# Patient Record
Sex: FEMALE
[De-identification: names, ages, dates, MRNs, and addresses within clinical notes are randomized per-mention and may not be internally consistent; named-entity substitution may affect disease eponyms.]

---

## 2019-05-29 ENCOUNTER — APPOINTMENT (OUTPATIENT)
Dept: GYNECOLOGIC ONCOLOGY | Facility: CLINIC | Age: 44
End: 2019-05-29
Payer: COMMERCIAL

## 2019-05-29 VITALS
SYSTOLIC BLOOD PRESSURE: 124 MMHG | OXYGEN SATURATION: 98 % | HEIGHT: 63 IN | HEART RATE: 82 BPM | DIASTOLIC BLOOD PRESSURE: 88 MMHG | RESPIRATION RATE: 16 BRPM | WEIGHT: 140 LBS | BODY MASS INDEX: 24.8 KG/M2

## 2019-05-29 DIAGNOSIS — Z80.7 FAMILY HISTORY OF OTHER MALIGNANT NEOPLASMS OF LYMPHOID, HEMATOPOIETIC AND RELATED TISSUES: ICD-10-CM

## 2019-05-29 DIAGNOSIS — Z80.0 FAMILY HISTORY OF MALIGNANT NEOPLASM OF DIGESTIVE ORGANS: ICD-10-CM

## 2019-05-29 DIAGNOSIS — K57.90 DIVERTICULOSIS OF INTESTINE, PART UNSPECIFIED, W/OUT PERFORATION OR ABSCESS W/OUT BLEEDING: ICD-10-CM

## 2019-05-29 DIAGNOSIS — F17.200 NICOTINE DEPENDENCE, UNSPECIFIED, UNCOMPLICATED: ICD-10-CM

## 2019-05-29 DIAGNOSIS — N83.209 UNSPECIFIED OVARIAN CYST, UNSPECIFIED SIDE: ICD-10-CM

## 2019-05-29 DIAGNOSIS — Z80.41 FAMILY HISTORY OF MALIGNANT NEOPLASM OF OVARY: ICD-10-CM

## 2019-05-29 DIAGNOSIS — Z78.9 OTHER SPECIFIED HEALTH STATUS: ICD-10-CM

## 2019-05-29 DIAGNOSIS — Z87.898 PERSONAL HISTORY OF OTHER SPECIFIED CONDITIONS: ICD-10-CM

## 2019-05-29 PROCEDURE — 99245 OFF/OP CONSLTJ NEW/EST HI 55: CPT | Mod: 25

## 2019-05-29 PROCEDURE — 93976 VASCULAR STUDY: CPT | Mod: 59

## 2019-05-29 PROCEDURE — 76857 US EXAM PELVIC LIMITED: CPT | Mod: 59

## 2019-05-29 PROCEDURE — 76830 TRANSVAGINAL US NON-OB: CPT | Mod: 59

## 2019-05-29 NOTE — PROCEDURE
[Cervical Pap] : cervical pap smear  [Thickened Endometrium] : thickened endometrium [Patient] : the patient

## 2019-05-29 NOTE — HISTORY OF PRESENT ILLNESS
[FreeTextEntry1] : This 42yo ,  x2, c/s x1 LMP at 41 presented to an urgent care on 19 for fatigue, diarrhea and vomiting. She was sent for a ctscan abd/pelvis which revealed a right ovarian cyst and a well defined somewhat thick-walled low density structure related to right adnexa. Also seen was diverticular disease and non obstructing fat containing supraumbilical hernia as well as a left renal cyst.  on 19 is 10. OVA 1 was 3.9 (low risk). Pt still has diarrhea once daily, denies blood in stools. She admits to bloating and abdominal discomfort. She does not have a PCP or Ob/gyn. History of abnormal paps and HPV and is overdue for a pap and mammogram. She thinks her mother may have had ovarian cancer and cervical cancer. \par \par Pap smear-over 5 years ago, h/o abnormal paps and HPV, h/o cryotherapy and laser\par Mammogram-5 years ago\par Colonoscopy-never

## 2019-05-29 NOTE — CHIEF COMPLAINT
[FreeTextEntry1] : [facility]\par \par [facility] [phone] at [address] [place] [id number]\par

## 2019-05-29 NOTE — ASSESSMENT
[FreeTextEntry1] : Discussed with patient that on sonogram today, her lining is thickened. Since she is postmenopausal, endometrial sampling is necessary. There is a 2cm benign appearing cyst on the left ovary which is not worrisome. Discussed the limitations of tumor markers. Pt thinks her mother may have had cervical and ovarian cancer but is not 100% sure. I encouraged the patient to get a more thorough cancer history from her mother and explained that it would be beneficial if her mother had genetic testing. Pt will look into this. If her mother definitely had ovarian cancer, then it would be prudent for patient to have it. Stressed to patient the importance of being compliant with her screening exams. Pap smear was performed today.   \par \par I discussed at length with the patient the nature, purpose, risks, benefits, and alternatives to dilation and curettage possible hysteroscopy possible myosure. She understands the risks to include (but not be limited to): uterine perforation with possible need for laparoscopy and/or laparotomy; infection with need for hospitalization; fluid overload with possible critical illness as a consequence; and bleeding with need for transfusion.  The patient agrees to proceed.\par

## 2019-05-29 NOTE — PHYSICAL EXAM
[Normal] : Parametria: Normal [de-identified] : Patient was interviewed and examined with chaperone present. Name of chaperone: Ashley Lane

## 2019-05-29 NOTE — END OF VISIT
[FreeTextEntry3] : Written by Ashley LINDSEY, acting as a scribe for Dr. Angel Boyce.\par This note accurately reflects the work and decisions made by me.\par

## 2019-05-30 LAB — HPV HIGH+LOW RISK DNA PNL CVX: NOT DETECTED

## 2019-06-03 LAB — CYTOLOGY CVX/VAG DOC THIN PREP: NORMAL

## 2019-06-12 DIAGNOSIS — Z01.818 ENCOUNTER FOR OTHER PREPROCEDURAL EXAMINATION: ICD-10-CM

## 2019-06-25 ENCOUNTER — TRANSCRIPTION ENCOUNTER (OUTPATIENT)
Age: 44
End: 2019-06-25

## 2019-06-25 ENCOUNTER — INPATIENT (INPATIENT)
Facility: HOSPITAL | Age: 44
LOS: 0 days | Discharge: HOME | DRG: 745 | End: 2019-06-25
Attending: OBSTETRICS & GYNECOLOGY | Admitting: OBSTETRICS & GYNECOLOGY
Payer: COMMERCIAL

## 2019-06-25 ENCOUNTER — RESULT REVIEW (OUTPATIENT)
Age: 44
End: 2019-06-25

## 2019-06-25 VITALS — RESPIRATION RATE: 17 BRPM | HEART RATE: 81 BPM | OXYGEN SATURATION: 100 % | TEMPERATURE: 98 F

## 2019-06-25 VITALS
DIASTOLIC BLOOD PRESSURE: 78 MMHG | SYSTOLIC BLOOD PRESSURE: 118 MMHG | HEIGHT: 60 IN | TEMPERATURE: 99 F | HEART RATE: 98 BPM | WEIGHT: 139.99 LBS | RESPIRATION RATE: 20 BRPM

## 2019-06-25 DIAGNOSIS — R10.2 PELVIC AND PERINEAL PAIN: ICD-10-CM

## 2019-06-25 LAB
ALBUMIN SERPL ELPH-MCNC: 4.8 G/DL — SIGNIFICANT CHANGE UP (ref 3.3–5.2)
ALP SERPL-CCNC: 76 U/L — SIGNIFICANT CHANGE UP (ref 40–120)
ALT FLD-CCNC: 10 U/L — SIGNIFICANT CHANGE UP
ANION GAP SERPL CALC-SCNC: 10 MMOL/L — SIGNIFICANT CHANGE UP (ref 5–17)
APPEARANCE UR: CLEAR — SIGNIFICANT CHANGE UP
AST SERPL-CCNC: 16 U/L — SIGNIFICANT CHANGE UP
BASOPHILS # BLD AUTO: 0 K/UL — SIGNIFICANT CHANGE UP (ref 0–0.2)
BASOPHILS NFR BLD AUTO: 0.3 % — SIGNIFICANT CHANGE UP (ref 0–2)
BILIRUB SERPL-MCNC: 0.5 MG/DL — SIGNIFICANT CHANGE UP (ref 0.4–2)
BILIRUB UR-MCNC: NEGATIVE — SIGNIFICANT CHANGE UP
BUN SERPL-MCNC: 19 MG/DL — SIGNIFICANT CHANGE UP (ref 8–20)
CALCIUM SERPL-MCNC: 9.8 MG/DL — SIGNIFICANT CHANGE UP (ref 8.6–10.2)
CHLORIDE SERPL-SCNC: 101 MMOL/L — SIGNIFICANT CHANGE UP (ref 98–107)
CO2 SERPL-SCNC: 27 MMOL/L — SIGNIFICANT CHANGE UP (ref 22–29)
COLOR SPEC: YELLOW — SIGNIFICANT CHANGE UP
CREAT SERPL-MCNC: 0.62 MG/DL — SIGNIFICANT CHANGE UP (ref 0.5–1.3)
DIFF PNL FLD: ABNORMAL
EOSINOPHIL # BLD AUTO: 0.4 K/UL — SIGNIFICANT CHANGE UP (ref 0–0.5)
EOSINOPHIL NFR BLD AUTO: 3.6 % — SIGNIFICANT CHANGE UP (ref 0–6)
EPI CELLS # UR: ABNORMAL
GLUCOSE SERPL-MCNC: 101 MG/DL — SIGNIFICANT CHANGE UP (ref 70–115)
GLUCOSE UR QL: NEGATIVE MG/DL — SIGNIFICANT CHANGE UP
HCG SERPL-ACNC: <4 MIU/ML — SIGNIFICANT CHANGE UP
HCT VFR BLD CALC: 40.9 % — SIGNIFICANT CHANGE UP (ref 37–47)
HGB BLD-MCNC: 13.8 G/DL — SIGNIFICANT CHANGE UP (ref 12–16)
KETONES UR-MCNC: NEGATIVE — SIGNIFICANT CHANGE UP
LEUKOCYTE ESTERASE UR-ACNC: NEGATIVE — SIGNIFICANT CHANGE UP
LYMPHOCYTES # BLD AUTO: 2.8 K/UL — SIGNIFICANT CHANGE UP (ref 1–4.8)
LYMPHOCYTES # BLD AUTO: 27.4 % — SIGNIFICANT CHANGE UP (ref 20–55)
MCHC RBC-ENTMCNC: 31.8 PG — HIGH (ref 27–31)
MCHC RBC-ENTMCNC: 33.7 G/DL — SIGNIFICANT CHANGE UP (ref 32–36)
MCV RBC AUTO: 94.2 FL — SIGNIFICANT CHANGE UP (ref 81–99)
MONOCYTES # BLD AUTO: 0.8 K/UL — SIGNIFICANT CHANGE UP (ref 0–0.8)
MONOCYTES NFR BLD AUTO: 7.3 % — SIGNIFICANT CHANGE UP (ref 3–10)
NEUTROPHILS # BLD AUTO: 6.3 K/UL — SIGNIFICANT CHANGE UP (ref 1.8–8)
NEUTROPHILS NFR BLD AUTO: 61.2 % — SIGNIFICANT CHANGE UP (ref 37–73)
NITRITE UR-MCNC: NEGATIVE — SIGNIFICANT CHANGE UP
PH UR: 6.5 — SIGNIFICANT CHANGE UP (ref 5–8)
PLATELET # BLD AUTO: 294 K/UL — SIGNIFICANT CHANGE UP (ref 150–400)
POTASSIUM SERPL-MCNC: 4.4 MMOL/L — SIGNIFICANT CHANGE UP (ref 3.5–5.3)
POTASSIUM SERPL-SCNC: 4.4 MMOL/L — SIGNIFICANT CHANGE UP (ref 3.5–5.3)
PROT SERPL-MCNC: 7.5 G/DL — SIGNIFICANT CHANGE UP (ref 6.6–8.7)
PROT UR-MCNC: NEGATIVE MG/DL — SIGNIFICANT CHANGE UP
RBC # BLD: 4.34 M/UL — LOW (ref 4.4–5.2)
RBC # FLD: 13.1 % — SIGNIFICANT CHANGE UP (ref 11–15.6)
RBC CASTS # UR COMP ASSIST: SIGNIFICANT CHANGE UP /HPF (ref 0–4)
SODIUM SERPL-SCNC: 138 MMOL/L — SIGNIFICANT CHANGE UP (ref 135–145)
SP GR SPEC: 1.01 — SIGNIFICANT CHANGE UP (ref 1.01–1.02)
UROBILINOGEN FLD QL: NEGATIVE MG/DL — SIGNIFICANT CHANGE UP
WBC # BLD: 10.3 K/UL — SIGNIFICANT CHANGE UP (ref 4.8–10.8)
WBC # FLD AUTO: 10.3 K/UL — SIGNIFICANT CHANGE UP (ref 4.8–10.8)
WBC UR QL: SIGNIFICANT CHANGE UP

## 2019-06-25 PROCEDURE — 80053 COMPREHEN METABOLIC PANEL: CPT

## 2019-06-25 PROCEDURE — 88305 TISSUE EXAM BY PATHOLOGIST: CPT | Mod: 26

## 2019-06-25 PROCEDURE — 99285 EMERGENCY DEPT VISIT HI MDM: CPT | Mod: 25

## 2019-06-25 PROCEDURE — 88305 TISSUE EXAM BY PATHOLOGIST: CPT

## 2019-06-25 PROCEDURE — 99053 MED SERV 10PM-8AM 24 HR FAC: CPT

## 2019-06-25 PROCEDURE — 87086 URINE CULTURE/COLONY COUNT: CPT

## 2019-06-25 PROCEDURE — 36415 COLL VENOUS BLD VENIPUNCTURE: CPT

## 2019-06-25 PROCEDURE — 84702 CHORIONIC GONADOTROPIN TEST: CPT

## 2019-06-25 PROCEDURE — 81001 URINALYSIS AUTO W/SCOPE: CPT

## 2019-06-25 PROCEDURE — 85027 COMPLETE CBC AUTOMATED: CPT

## 2019-06-25 PROCEDURE — 96374 THER/PROPH/DIAG INJ IV PUSH: CPT

## 2019-06-25 RX ORDER — ACETAMINOPHEN 500 MG
1000 TABLET ORAL ONCE
Refills: 0 | Status: COMPLETED | OUTPATIENT
Start: 2019-06-25 | End: 2019-06-25

## 2019-06-25 RX ORDER — KETOROLAC TROMETHAMINE 30 MG/ML
15 SYRINGE (ML) INJECTION ONCE
Refills: 0 | Status: DISCONTINUED | OUTPATIENT
Start: 2019-06-25 | End: 2019-06-25

## 2019-06-25 RX ORDER — SODIUM CHLORIDE 9 MG/ML
1000 INJECTION, SOLUTION INTRAVENOUS
Refills: 0 | Status: DISCONTINUED | OUTPATIENT
Start: 2019-06-25 | End: 2019-06-25

## 2019-06-25 RX ADMIN — Medication 400 MILLIGRAM(S): at 12:26

## 2019-06-25 RX ADMIN — Medication 1000 MILLIGRAM(S): at 12:32

## 2019-06-25 RX ADMIN — SODIUM CHLORIDE 125 MILLILITER(S): 9 INJECTION, SOLUTION INTRAVENOUS at 12:27

## 2019-06-25 RX ADMIN — Medication 15 MILLIGRAM(S): at 08:07

## 2019-06-25 NOTE — ED STATDOCS - PROGRESS NOTE DETAILS
44 y/o F pt with PMHx of , ovarian cyst, herniated discs presents to the ED c/o pelvic pain, onset of midnight last night, pt is post menopausal. Denies vaginal bleeding, fevers, chills, N/V/D. + suprapubic tenderness HPI/ ROS/ PEx as stated above. Labs, urine, hcg and pelvic US ordered. Pt to be admitted to GYN-ONC. Curtis Boyce.

## 2019-06-25 NOTE — DISCHARGE NOTE PROVIDER - NSDCHC_MEDRECLITE_GEN_ALL_CORE
Pt and  report she is pg and wants to know how far along she is and she has nausea -denies vaginal bleeding    
Click to Modify Medication Indication on Note Save

## 2019-06-25 NOTE — H&P ADULT - HISTORY OF PRESENT ILLNESS
GYNECOLOGIC ONCOLOGY CONSULT NOTE    43y   x 2, c/s x 1, Last Menstrual Period at age 41 known to our service for ovarian cyst, mass and thickened endometrial lining. Patient seen in our office last month with CT scan revealing right ovarian cyst and a well defined somewhat thick-walled low density structure related to R. adnexa. CA-125 was WNL (10). Patient had complaint of abdominal bloating and discomfort at that time which she states has persisted. She reports this morning she woke up with severe pelvic pain that brought her into the ED today. Pain is noted to be 10/10 despite medication. US in office also revealed thickened endometrial lining. She admits to occasional diarrhea for the past month. She denies any fevers, CP, SOB, N/V, vaginal bleeding or calf pain.    Pt. reports possible ovarian and cervical cancer in her mother.

## 2019-06-25 NOTE — DISCHARGE NOTE PROVIDER - CARE PROVIDER_API CALL
Angel Boyce)  Gynecologic Oncology; Obstetrics and Gynecology  [address] [place] [address]  Phone: [phone]  Fax: [phone]  Follow Up Time:

## 2019-06-25 NOTE — ED STATDOCS - OBJECTIVE STATEMENT
44 y/o F pt with PMHx of , ovarian cyst, herniated discs presents to the ED c/o pelvic pain, onset of midnight last night. Patient rolled over at midnight last night and was woken up from the pain. Pt is post-menopause of 3 years. States she has a hx of pelvic pain, and bleeding. Currently has a solid right ovarian cyst. She states this is not a "normal" pain. Feels as if something is bring crushed inside of her. No blood in urine. Was recommended by Dr. Curtis Boyce to come to [facility] if she had pelvic pain. Denies dysuria, n/v/d.

## 2019-06-25 NOTE — ED ADULT NURSE NOTE - OBJECTIVE STATEMENT
Assumed pt care at 0750.  Pt a&ox4 c/o pelvic pain 8/10 that started around midnight.  No acute s/s of respiratory distress noted or reported at this time, will continue to monitor

## 2019-06-25 NOTE — ED ADULT TRIAGE NOTE - CHIEF COMPLAINT QUOTE
since midnight constant sharp lower pelvic pain, patient of Curtis Boyce told to come to [facility] if this pain ever occurred. Hx of right ovarian cysts

## 2019-06-25 NOTE — H&P ADULT - PROBLEM SELECTOR PLAN 1
Will admit to GYN/ONC for D&C.  Patient to see GI outpatient for diarrhea  Toradol and Ofirmev IV stat for pain control  Will dc home following procedure with follow-up in office to review path pending patient recovering well in PACU    Plan discussed with Dr. Boyce

## 2019-06-25 NOTE — ED STATDOCS - CLINICAL SUMMARY MEDICAL DECISION MAKING FREE TEXT BOX
Patient presents with sudden onset lower abdominal pain 7 hours PTA. Mild tenderness noted in suprapubic region. Obtain labs, urine, pregnancy test, pelvic US to rule out pelvic pathology. If US negative and pt is still in pain, consider CT.

## 2019-06-25 NOTE — H&P ADULT - NSHPPHYSICALEXAM_GEN_ALL_CORE
PHYSICAL EXAM:    GENERAL: NAD, well-developed  HEAD:  Atraumatic, Normocephalic  EYES: conjunctiva and sclera clearns  NECK: Supple, No JVD, Normal thyroid  NERVOUS SYSTEM:  Alert & Oriented X3, Good concentration  CHEST/LUNG: Clear to percussion bilaterally; No rales, rhonchi, wheezing, or rubs  HEART: Regular rate and rhythm; No murmurs, rubs, or gallops  ABDOMEN: Tender to light palpation in bilateral Lower quadrants, Soft, Nondistended; Bowel sounds present, No rebound, No guarding  EXTREMITIES:  2+ Peripheral Pulses, No clubbing, cyanosis, or edema, Jaime's sign negative  LYMPH: No lymphadenopathy noted  SKIN: No rashes or lesions  PELVIC: Deferred

## 2019-06-25 NOTE — H&P ADULT - NSHPREVIEWOFSYSTEMS_GEN_ALL_CORE
REVIEW OF SYSTEMS:    CONSTITUTIONAL: No fever, weight loss, or fatigue  EYES: No eye pain, visual disturbances, or discharge  ENMT:  No difficulty hearing, tinnitus, vertigo; No sinus or throat pain  NECK: No pain or stiffness  BREASTS: No pain, masses, or nipple discharge  RESPIRATORY: No cough, wheezing, chills or hemoptysis; No shortness of breath  CARDIOVASCULAR: No chest pain, palpitations, dizziness, or leg swelling  GASTROINTESTINAL: Diarrhea, No abdominal or epigastric pain. No nausea, vomiting, or hematemesis; No constipation. No melena or hematochezia.  GENITOURINARY: Pelvic pain, No dysuria, frequency, hematuria, or incontinence  NEUROLOGICAL: No headaches, memory loss, loss of strength, numbness, or tremors  SKIN: No itching, burning, rashes, or lesions   LYMPH NODES: No enlarged glands  ENDOCRINE: No heat or cold intolerance; No hair loss  MUSCULOSKELETAL: No joint pain or swelling; No muscle, back, or extremity pain  PSYCHIATRIC: No depression, anxiety, mood swings, or difficulty sleeping  HEME/LYMPH: No easy bruising, or bleeding gums  ALLERY AND IMMUNOLOGIC: No hives or eczema

## 2019-06-25 NOTE — H&P ADULT - ASSESSMENT
44yo with known R. ovarian cyst, R. adnexal structure and thickened endometrial lining. She now reports to ED with severe pelvic pain and occasional diarrhea.

## 2019-06-25 NOTE — H&P ADULT - NSHPLABSRESULTS_GEN_ALL_CORE
LABS:                        13.8   10.3  )-----------( 294      ( 2019 08:19 )             40.9         138  |  101  |  19.0  ----------------------------<  101  4.4   |  27.0  |  0.62    Ca    9.8      2019 08:19    TPro  7.5  /  Alb  4.8  /  TBili  0.5  /  DBili  x   /  AST  16  /  ALT  10  /  AlkPhos  76  06-25      Urinalysis Basic - ( 2019 08:22 )    Color: Yellow / Appearance: Clear / S.010 / pH: x  Gluc: x / Ketone: Negative  / Bili: Negative / Urobili: Negative mg/dL   Blood: x / Protein: Negative mg/dL / Nitrite: Negative   Leuk Esterase: Negative / RBC: 0-2 /HPF / WBC 0-2   Sq Epi: x / Non Sq Epi: Moderate / Bacteria: x    Vital Signs Last 24 Hrs  T(F): 98.6 (2019 06:14), Max: 98.6 (2019 06:14)  HR: 98 (2019 06:14) (98 - 98)  BP: 118/78 (2019 06:14) (118/78 - 118/78)  RR: 20 (2019 06:14) (20 - 20)

## 2019-06-25 NOTE — DISCHARGE NOTE PROVIDER - HOSPITAL COURSE
42yo known to our service for ovarian cyst, ovarian structure and thickened endometrial lining. Patient presented to ED with c/o severe pelvic pain. She underwent D&C hysteroscopy with myosure. Patient post-operatively had an uncomplicated hospital course. Her pain was well controlled with medication. VS stable for discharge.

## 2019-06-25 NOTE — ED ADULT NURSE NOTE - CHIEF COMPLAINT QUOTE
since midnight constant sharp lower pelvic pain, patient of Curtis Boyce told to come to [facility] if this pain ever occurred. Hx of right ovarian cysts

## 2019-06-26 LAB
CULTURE RESULTS: SIGNIFICANT CHANGE UP
SPECIMEN SOURCE: SIGNIFICANT CHANGE UP

## 2019-06-27 DIAGNOSIS — Z98.891 HISTORY OF UTERINE SCAR FROM PREVIOUS SURGERY: Chronic | ICD-10-CM

## 2019-06-27 LAB — SURGICAL PATHOLOGY STUDY: SIGNIFICANT CHANGE UP

## 2019-06-28 PROBLEM — N83.209 UNSPECIFIED OVARIAN CYST, UNSPECIFIED SIDE: Chronic | Status: ACTIVE | Noted: 2019-06-27

## 2019-07-05 ENCOUNTER — APPOINTMENT (OUTPATIENT)
Dept: GYNECOLOGIC ONCOLOGY | Facility: CLINIC | Age: 44
End: 2019-07-05
Payer: COMMERCIAL

## 2019-07-05 VITALS
RESPIRATION RATE: 16 BRPM | SYSTOLIC BLOOD PRESSURE: 122 MMHG | WEIGHT: 140 LBS | BODY MASS INDEX: 24.8 KG/M2 | DIASTOLIC BLOOD PRESSURE: 84 MMHG | HEIGHT: 63 IN | HEART RATE: 85 BPM | OXYGEN SATURATION: 95 %

## 2019-07-05 DIAGNOSIS — R93.89 ABNORMAL FINDINGS ON DIAGNOSTIC IMAGING OF OTHER SPECIFIED BODY STRUCTURES: ICD-10-CM

## 2019-07-05 PROCEDURE — 99212 OFFICE O/P EST SF 10 MIN: CPT

## 2019-07-05 RX ORDER — METHOCARBAMOL 750 MG/1
750 TABLET, FILM COATED ORAL EVERY 8 HOURS
Qty: 84 | Refills: 0 | Status: DISCONTINUED | COMMUNITY
Start: 2019-05-14 | End: 2019-07-05

## 2019-07-05 RX ORDER — MISOPROSTOL 100 UG/1
100 TABLET ORAL
Qty: 2 | Refills: 0 | Status: DISCONTINUED | COMMUNITY
Start: 2019-05-29 | End: 2019-07-05

## 2019-07-05 RX ORDER — CYCLOBENZAPRINE HYDROCHLORIDE 10 MG/1
10 TABLET, FILM COATED ORAL
Qty: 10 | Refills: 0 | Status: DISCONTINUED | COMMUNITY
Start: 2019-05-09 | End: 2019-07-05

## 2019-07-05 RX ORDER — TRAMADOL HYDROCHLORIDE 50 MG/1
50 TABLET, COATED ORAL
Qty: 42 | Refills: 0 | Status: DISCONTINUED | COMMUNITY
Start: 2019-05-14 | End: 2019-07-05

## 2019-07-08 PROBLEM — R93.89 ENDOMETRIAL THICKENING ON ULTRASOUND: Status: ACTIVE | Noted: 2019-05-29

## 2019-07-30 NOTE — END OF VISIT
[FreeTextEntry3] : Written by Margi Schafer, acting as a scribe for Dr. Angel Boyce.\par This note accurately reflects the work and decision made by me.\par

## 2019-07-30 NOTE — DISCUSSION/SUMMARY
[Doing Well] : is doing well [Excellent Pain Control] : has excellent pain control [No Sign of Infection] : is showing no signs of infection [Cervical Abnormality] : normal cervix [External Genitalia Abnormal] : normal external genitalia [Vaginal Exam Abnormal] : normal vaginal exam

## 2019-07-30 NOTE — REASON FOR VISIT
[Post Op] : post op visit [de-identified] : 6/25/19 [de-identified] : EUA, dilation and curettage, diagnostic hysteroscopy for thickened endometrium. Surgery was performed on 6/25/19 at [facility]

## 2019-10-04 ENCOUNTER — APPOINTMENT (OUTPATIENT)
Dept: GYNECOLOGIC ONCOLOGY | Facility: CLINIC | Age: 44
End: 2019-10-04

## 2022-11-21 NOTE — DISCHARGE NOTE PROVIDER - NSDCFUADDINST_GEN_ALL_CORE_FT
No new care gaps identified.  [facility] Embedded Care Gaps. Reference number: [id number]. 11/21/2022   8:45:20 AM CST   May take naproxen as prescribed for pain control.     Nothing per vagina, shower only.

## 2023-06-15 NOTE — ED ADULT TRIAGE NOTE - SOURCE OF INFORMATION
chest wall non-tender,breathing is unlabored without accessory muscle use,normal breath sounds
Patient